# Patient Record
Sex: MALE | Race: OTHER | Employment: FULL TIME | URBAN - METROPOLITAN AREA
[De-identification: names, ages, dates, MRNs, and addresses within clinical notes are randomized per-mention and may not be internally consistent; named-entity substitution may affect disease eponyms.]

---

## 2018-12-27 PROCEDURE — 86735 MUMPS ANTIBODY: CPT | Performed by: FAMILY MEDICINE

## 2018-12-27 PROCEDURE — 86762 RUBELLA ANTIBODY: CPT | Performed by: FAMILY MEDICINE

## 2018-12-27 PROCEDURE — 86765 RUBEOLA ANTIBODY: CPT | Performed by: FAMILY MEDICINE

## 2018-12-27 PROCEDURE — 36415 COLL VENOUS BLD VENIPUNCTURE: CPT | Performed by: FAMILY MEDICINE

## 2021-04-09 PROBLEM — F90.9 ADHD: Status: ACTIVE | Noted: 2021-04-09

## 2021-04-09 PROBLEM — F41.9 ANXIETY: Status: ACTIVE | Noted: 2021-04-09

## 2024-11-07 ENCOUNTER — HOSPITAL ENCOUNTER (EMERGENCY)
Age: 39
Discharge: HOME | End: 2024-11-07
Attending: EMERGENCY MEDICINE
Payer: COMMERCIAL

## 2024-11-07 VITALS
HEIGHT: 69 IN | DIASTOLIC BLOOD PRESSURE: 105 MMHG | BODY MASS INDEX: 26.66 KG/M2 | RESPIRATION RATE: 19 BRPM | HEART RATE: 97 BPM | WEIGHT: 180 LBS | SYSTOLIC BLOOD PRESSURE: 164 MMHG | OXYGEN SATURATION: 97 % | TEMPERATURE: 98 F

## 2024-11-07 DIAGNOSIS — J02.0 STREPTOCOCCAL SORE THROAT: Primary | ICD-10-CM

## 2024-11-07 DIAGNOSIS — R03.0 ELEVATED BLOOD PRESSURE READING: ICD-10-CM

## 2024-11-07 LAB
POCT INFLUENZA A: NEGATIVE
POCT INFLUENZA B: NEGATIVE
SARS-COV-2 RNA RESP QL NAA+PROBE: NOT DETECTED

## 2024-11-07 PROCEDURE — 96372 THER/PROPH/DIAG INJ SC/IM: CPT

## 2024-11-07 PROCEDURE — 87502 INFLUENZA DNA AMP PROBE: CPT | Performed by: EMERGENCY MEDICINE

## 2024-11-07 PROCEDURE — 99284 EMERGENCY DEPT VISIT MOD MDM: CPT

## 2024-11-07 PROCEDURE — 87502 INFLUENZA DNA AMP PROBE: CPT

## 2024-11-07 PROCEDURE — 99283 EMERGENCY DEPT VISIT LOW MDM: CPT

## 2024-11-07 PROCEDURE — 87430 STREP A AG IA: CPT

## 2024-11-07 PROCEDURE — 87430 STREP A AG IA: CPT | Performed by: EMERGENCY MEDICINE

## 2024-11-07 RX ORDER — DEXAMETHASONE SODIUM PHOSPHATE 10 MG/ML
10 INJECTION, SOLUTION INTRAMUSCULAR; INTRAVENOUS ONCE
Status: COMPLETED | OUTPATIENT
Start: 2024-11-07 | End: 2024-11-07

## 2024-11-07 RX ORDER — AMOXICILLIN 875 MG/1
875 TABLET, COATED ORAL 2 TIMES DAILY
Qty: 20 TABLET | Refills: 0 | Status: SHIPPED | OUTPATIENT
Start: 2024-11-07 | End: 2024-11-17

## 2024-11-07 RX ORDER — AMOXICILLIN 875 MG/1
875 TABLET, COATED ORAL ONCE
Status: COMPLETED | OUTPATIENT
Start: 2024-11-07 | End: 2024-11-07

## 2024-11-07 NOTE — ED INITIAL ASSESSMENT (HPI)
Sorethroat x  2 days . No cough . No fever . OTC cold and flu meds without relief . Family sick with same symptoms

## 2024-11-07 NOTE — ED QUICK NOTES
DC instr were given re strep throat. To take the meds as prescribed. To push fluids in small,freq amts. To medicate with Tyl/Motrin prn pain. To f/u with his PMD in several days. No sharing of cups/utensils. To return to the nearest ER if any problems occur. He expressed an understanding and was dc'd home,in nad. To stop taking the OTC cough/cold meds(d/t an elevated BP).

## 2024-11-07 NOTE — ED PROVIDER NOTES
Patient Seen in: [facility] [place]      History     Chief Complaint   Patient presents with    Sore Throat     Stated Complaint: Sore throat x3 nights, headache, body aches    Subjective:   HPI      Patient is a 39-year-old male presenting to the ED with sore throat for the last 2 days.  The patient just returned home from [facility] when he developed the symptoms.  No fever or chills.  Patient does have painful swallowing.  No shortness of breath or difficulty swallowing.  Patient has not had associated cough.  Patient has been using OTC cough and flu medications for the last couple of days without improvement.  His family has similar symptoms.  He also notes he has a history of elevated blood pressure typically about 140 but does not take anything daily.  No chest pain or shortness of breath.  No headache or change in mental status.    Objective:     No pertinent past medical history.            No pertinent past surgical history.              No pertinent social history.                Physical Exam     ED Triage Vitals [11/07/24 0030]   BP (!) 174/102   Pulse 104   Resp 18   Temp 98 °F (36.7 °C)   Temp src Oral   SpO2 98 %   O2 Device None (Room air)       Current Vitals:   Vital Signs  BP: (!) 164/105  Pulse: 97  Resp: 19  Temp: 98.4 °F (36.9 °C)  Temp src: Oral    Oxygen Therapy  SpO2: 97 %  O2 Device: None (Room air)        Physical Exam  Vitals and nursing note reviewed.   Constitutional:       General: He is not in acute distress.     Appearance: Normal appearance. He is not ill-appearing.   HENT:      Head: Normocephalic and atraumatic.      Right Ear: External ear normal.      Left Ear: External ear normal.      Nose: Nose normal.      Mouth/Throat:      Mouth: Mucous membranes are moist.      Pharynx: Oropharynx is clear. Uvula midline. Posterior oropharyngeal erythema present. No oropharyngeal exudate or uvula swelling.      Tonsils: No tonsillar exudate or tonsillar  abscesses. 2+ on the right. 2+ on the left.      Comments: Uvula is midline.  Eyes:      Conjunctiva/sclera: Conjunctivae normal.   Cardiovascular:      Rate and Rhythm: Normal rate and regular rhythm.   Pulmonary:      Effort: Pulmonary effort is normal. No respiratory distress.      Breath sounds: Normal breath sounds.   Abdominal:      General: Abdomen is flat. Bowel sounds are normal. There is no distension.      Tenderness: There is no abdominal tenderness.   Musculoskeletal:      Cervical back: Neck supple. Tenderness present.      Right lower leg: No edema.      Left lower leg: No edema.   Lymphadenopathy:      Cervical: Cervical adenopathy present.   Skin:     General: Skin is warm.      Capillary Refill: Capillary refill takes less than 2 seconds.      Findings: No rash.   Neurological:      Mental Status: He is alert and oriented to person, place, and time.   Psychiatric:         Mood and Affect: Mood normal.         Behavior: Behavior normal.             ED Course     Labs Reviewed   RAPID STREP A SCREEN (LC) - Abnormal; Notable for the following components:       Result Value    Rapid Strep A Result Positive for Beta Streptococcus, Group A (*)     All other components within normal limits   RAPID SARS-COV-2 BY PCR - Normal   POCT FLU TEST - Normal    Narrative:     This assay is a rapid molecular in vitro test utilizing nucleic acid amplification of influenza A and B viral RNA.       ED Course as of 11/07/24 0856  ------------------------------------------------------------  Time: 11/07 0158  Comment: Sore throat for 2 days.  Painful swallowing.  No shortness of breath.  No fever.  Has been using OTC cold and flu relief.  He states BP is typically about 140.  It was elevated today.  Advised to stop taking these as they typically contain decongestants.  Outpatient reevaluation of BP.              MDM      History is obtained from patient who is a good historian.  Differential diagnosis includes strep  throat, viral tonsillitis or pharyngitis, viral URI such as COVID or influenza, no evidence of peritonsillar abscess or deep space infection on exam.  Patient remains afebrile in the ED as well as prior to arrival.  No difficulty swallowing although he does have painful swallowing.  No shortness of breath or stridor.  Testing considered and ordered includes strep and viral testing.  These results were reviewed.  Patient did test positive for strep.  These results were discussed with patient as well as need for outpatient antibiotics with return to ED if any symptoms worsen, persist, or new symptoms develop.  The patient does have tonsillar enlargement and painful swallowing.  Decadron 10 mg IM was given prior to discharge in addition to patient's first dose of antibiotics.  Discussed liquids as well as soft diet.  Hot or cold fluids what ever feels better for patient comfort.  Return to ED immediately if any symptoms worsen, persist, or new symptoms develop.  Otherwise, recommend outpatient follow-up with primary care provider for reevaluation of all symptoms.    Patient's previous records were reviewed.  Patient last seen by urology August 2024.  Then seen by ENT in July 2024.  Patient was noted to have elevated blood pressure reading without diagnosis of hypertension in 2021 with primary care provider.    Patient does have an elevated blood pressure noted in the ED although he has been taking OTC cold and flu medications which typically contain decongestants which can increase blood pressure.  This was discussed with patient as well as discontinuation of these medications using only Tylenol and/or Motrin over-the-counter as directed as needed for pain, clear or hot liquids, in addition to the importance of compliance with antibiotics.  The patient was comfortable with this discharge plan.  Recommend outpatient reevaluation of blood pressure or initiation of medications if this remains elevated after  discontinuation of these decongestant containing medications.  Patient was comfortable discharge plan.    Medical Decision Making      Disposition and Plan     Clinical Impression:  1. Streptococcal sore throat    2. Elevated blood pressure reading         Disposition:  Discharge  11/7/2024  1:58 am    Follow-up:  Hernan Candelario MD  [address] [address]  [phone]    Schedule an appointment as soon as possible for a visit      [facility] in [address] [address]  [phone]  Follow up  IF SYMPTOMS WORSEN, PERSIST, OR NEW SYMPTOMS DEVEL          Medications Prescribed:  Discharge Medication List as of 11/7/2024  2:15 AM        START taking these medications    Details   amoxicillin 875 MG Oral Tab Take 1 tablet (875 mg total) by mouth 2 (two) times daily for 10 days., Normal, Disp-20 tablet, R-0                 Supplementary Documentation:

## (undated) NOTE — LETTER
Date & Time: 11/7/2024, 2:36 AM  Patient: Campos Jhaveri  Encounter Provider(s):    Kate Giraldo DO       To Whom It May Concern:    Campos Jhaveri was seen and treated in our department on 11/7/2024. He can return to work in 2 days. Off 11/7 and 11/8/2024.    If you have any questions or concerns, please do not hesitate to call.        _____________________________  Physician/AP[facility] Signature